# Patient Record
Sex: MALE | Employment: FULL TIME | URBAN - METROPOLITAN AREA
[De-identification: names, ages, dates, MRNs, and addresses within clinical notes are randomized per-mention and may not be internally consistent; named-entity substitution may affect disease eponyms.]

---

## 2021-04-30 ENCOUNTER — OFFICE VISIT (OUTPATIENT)
Dept: FAMILY MEDICINE | Facility: CLINIC | Age: 51
End: 2021-04-30

## 2021-04-30 DIAGNOSIS — Z02.89 HEALTH EXAMINATION OF DEFINED SUBPOPULATION: Primary | ICD-10-CM

## 2021-04-30 LAB
BILIRUB UR QL: NORMAL
CLARITY: CLEAR
COLOR UR: YELLOW
GLUCOSE URINE: NORMAL MG/DL
HGB UR QL: NORMAL
KETONES UR QL: NORMAL MG/DL
NITRITE UR QL STRIP: NORMAL
PH UR STRIP: 6.5 PH (ref 5–7)
PROT UR QL: NORMAL MG/DL
SP GR UR STRIP: 1.02 (ref 1–1.03)
SPECIMEN VOL UR: NORMAL ML
UROBILINOGEN UR QL STRIP: 0.2 EU/DL (ref 0.2–1)
WBC #/AREA URNS HPF: NORMAL /[HPF]

## 2021-04-30 PROCEDURE — 81003 URINALYSIS AUTO W/O SCOPE: CPT | Performed by: FAMILY MEDICINE

## 2021-04-30 PROCEDURE — 99499 UNLISTED E&M SERVICE: CPT | Performed by: FAMILY MEDICINE

## 2021-04-30 NOTE — PROGRESS NOTES
"Form [id number]-5875 (revised 2015)                                       OMB No. [id number]  Expiration Date: 2019    MEDICAL EXAMINATION REPORT FORM  (FOR  MEDICAL CERTIFICATION)    SECTION 1.  Information (to be filled out by )    PERSONAL INFORMATION    Last Name:  Trista  First Name: Dm Gutierres Initial: WILFRID  : 1970      Age: 50        Street Address: [address] [place] [address]   City: [place]   State/Province: [place]   Zip Code: [id number]  's License Number: [id number]      Issuing State/Province: [place]       Phone: [phone]     Gender: male  E-mail (optional):   CLP/CDL Applicant Lemon*: no   ID Verified by**:  Drivers License  Has your [employer] medical certificate ever been denied or issued for less than 2 years?  NO     (*CLP/CDL Applicant/Lemon: See instructions for definitions)  (** ID verified By:Record what type of photo ID was used to verify the identity of the , e.g., CDL,'s license, passport)       HEALTH HISTORY    Have you ever had surgery? If \"yes\", please list and explain below.      PMH:    None    PSH:    Appendectomy -   Left partial knee replacement - 2020  Right leg - multiple - Lower leg x 2, foot x 4 -  to     Meds:    None    NKDA       Are you currently taking medications (prescription, over-the-counter, herbal remedies, diet supplements)? If \"yes,\" please describe below.     NO        HEALTH HISTORY (continued)          Do you have or have you ever had:                                                     Not  Yes    No     Sure                                                                          Not    Yes     No   Sure    1. Head/brain injuries or illness (e.g., concussion)  NO 16. Dizziness, headaches, numbness, tingling, or memory loss   NO   2. Seizures, epilepsy  NO 17. Unexplained weight loss   NO   3. Eye problems (except glasses or contacts)  NO 18. Stroke, mini stroke " "(TIA), paralysis, or weakness   NO   4. Ear and/or hearing problems   NO 19. Missing or limited use of arm, hand, finger, leg, foot, toe   NO   5. Heart disease, heart attack, bypass, or other heart problems   NO 20. Neck or back problems   NO   6. Pacemaker, stents, implantable devices, or other heart procedures   NO 21. Bone, muscle, joint or nerve problems   NO   7. High blood preassure   NO 22. Blood clots or bleeding problems   NO   8. High cholesterol   NO 23. Cancer   NO   9. Chronic (long term) cough, shortness of breath, or other breathing problems   NO 24. Chronic (long term) infection or other chronic disease   NO   10. Lung disease (e.g., asthma)   NO 25. Sleep disorders, pauses in breathing while asleep, daytime sleepiness, loud snoring   NO   11. Kidney problems, kidney stones, or pain/problems with urination   NO 26. Have you ever had a sleep test? (e.g., sleep apnea)   NO   12. Stomach, liver, or digestive problems   NO 27. Have you ever spent the night in the hospital?   YES   13. Diabetes or blood sugar problems   NO 28. Have you ever had a broken bone?   YES         Insulin used   NO 29. Have you ever used or do you now use tobacco?   NO   14. Anxiety, depression, nervousness, other mental health problems  NO 30. Do you currently drink alcohol?    YES  2-3 per week   15. Fainting or passing out   NO 31.  Have you used an illegal substance within the past two years?    NO     32. Have you ever failed a drug test or been dependent on an illegal substance?    NO     Other health condition(s) not described above:   NO    See above     Did you answer \"yes\" to any of questions 1-32?  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 I understand that inaccurate, false or missing information may " "invalidate the examination and my Medical Examiner's Certificate, that submission of fraudulent or intentionally false information is a violation of 49CFR 390.35, and that submission of fraudulent or intentionally false information may subject me to civil or ciminal penalties under 49 .37 and 49  Appendices A and B.     's signature:____________________________        Date: 4/30/2021                                         Signature if printed       Section 2. Examination Report (to be filled out by the medical examiner)      HEALTH HISTORY REVIEW  Review and discuss pertinent  answers and any available medical records. Comment on the 's responses to the \"health history\" questions that may affect the 's safe operation of a commercial motor vehicle (CMV).       Extensive review, see above            TESTING     Pulse rate: 91     Pulse rhythm regular: YES  Height: 6 feet 2 inches  Weight: 200 pounds    Blood Pressure  Blood Pressure: 138 Systolic  87 Diastolic  Sitting yes  Second Reading (optional) n/a  Other Testing if indicated          Urinalysis  Urinalysis is required. Numerical readings must be recorded.  Urine Specimen Specific Gravity Protein Blood Sugar    1.025 NEGATIVE NEGATIVE NEGATIVE   Protein, blood or sugar in the urine may be an indication for further testing to rule out any underlying medical problem.    Vision  Standard is at least 20/40 acuity (Snellen) in each eye with or without correction. At least 70  field of vision in horizontal meridian measured in each eye. The use of corrective lenses should be noted on the Medical Examiner's Certificate.    ACUITY UNCORRECTED CORRECTED HORIZONTAL FIELD OF VISION   Right Eye N/A 20/20 Greater than 70 degrees   Left Eye N/A 20/25 Greater than 70 degrees   Both Eyes N/A 20/20      Applicant can recognize and distinguish among traffic control signals and devices showing red, green and saúl colors? " Yes    Monocular vision: No    Referred to ophthalmologist or optometrist?  No    Received documentation from ophthalmologist or optometrist?  No    HEARING   Standard: Must first perceive forced whispered voice at not less than 5 feet OR average hearing loss of less than or equal to 40 dB, in better ear (with or without hearing aid).    Check if hearing aid used for test:  Neither    Whispered voice test:    Record the distance from the individual at which a forced whispered voice can first be heard:        Right Ear: greater than 5 feet                  Left Ear: greater than 5 feet             PHYSICAL EXAMINATION  The presence of a certain condition may not necessarily disqualify a , particularly if the condition is controlled adequately, is not likely to worsen, or is readily amenable to treatment. Even if a condition does not disqualify a , the Medical Examiner may consider deferring the  temporarily. Also, the  should be advised to take the necessary steps to correct the condition as soon as possible, particularly if neglecting the condition, could result in more serious illness that might affect driving.    Check the body systems for abnormalities.  BODY SYSTEM Normal or Abnormal   1. General  Normal   2. Skin Normal   3. Eyes Normal   4. Ears Normal   5. Mouth/throat Normal   6. Cardiovascular Normal   7. Lungs/chest Normal   8. Abdomen Normal   9. Genito-urinary System including hernias Normal   10. Back/Spine Normal   11. Extremities/joints Normal   12. Neurological system including reflexes Normal   13. Gait Normal   14. Vascular system Normal     Discuss any abnormal answers in detail in the space below and indicate whether it would affect the 's ability to operate a CMV. Enter applicable item number before each comment.     no concerns            Please complete only one of the following (Federal or State) Medical Examiner Determination sections:    MEDICAL EXAMINER  DETERMINATION (Federal)  Use this section for examinations performed in accordance with the Federal Motor Carrier Safety Regulations (49 ..49):    [  ] Does not meet standards (specify reason) ________________________________________________________________________________  [  ] Meets standards in 49 .41; qualifies for 2-year certificate  [  ] Meets standards, but periodic monitoring required (specify reason) ___________________________________________________________         qualified for:   [  ] 3 months               [  ] 6 months               [  ] 1 year            [  ] other (specify): ________________________________  [  ]  Wearing corrective lenses        [  ] Wearing hearing aid        [  ] Accompanied by a waiver/exception(specify type): ____________________  [  ] Accompanied by a Skill Performance Evaluation (SPE) Certificate    [  ]  Qualified by operation of 49 .64 (Federal)  [  ] Driving within an exempt intracity zone (see 49 .62) (Federal)  [  ] Determination pending (specify reason) __________________________________________________________________________________        [  ] Return to medical exam office for follow-up on (must be 45 days or less _______________________________        [  ] Medical Examination Report amended (specify reason) ______________________________________________                    (if amended) Medical Examiner's Signature _____________________________________________________ Date: _______________  [  ] Incomplete examination (specify reason): _________________________________________________________________________________            If the  meets the standards outlined in 49 .41, then complete a Medical Examiner's Certificate as stated in 49 .43(h), as appropriate.     I have performed this evaluation for certification. I have personally reviewed all available records and recorded information pertaining to this  evaluation, and attest that to the best of my knowledge, I believe it to be true and correct.    Medical Examiner's Signature: _________________________________________                                                                                   (if printed)    Medical Examiner's Name: Douglas Santiago MD  Medical Examiner's Address:   [address] [id number] [phone]  Dept: [phone]    Date Certificate Signed: 4/30/2021      Medical Examiner's State License, Certificate, or Registration Number: [id number]    Issuing State[place]    [ X ] M.D.   [  ] D.O.   [  ] Physician Assistant   [  ] Chiropractor   [  ] Advanced Practice Nurse  [  ] Other Practitioner (specify) __________________________________________________    National Registry Number:  [id number]                Medical Examiner's Certificate Expiration Date: 4/30/2023                          Date submitted to registry: [email or web address]  Submitted by: DEANDRE